# Patient Record
Sex: MALE | Race: WHITE
[De-identification: names, ages, dates, MRNs, and addresses within clinical notes are randomized per-mention and may not be internally consistent; named-entity substitution may affect disease eponyms.]

---

## 2018-07-27 ENCOUNTER — HOSPITAL ENCOUNTER (OUTPATIENT)
Dept: HOSPITAL 62 - RAD | Age: 23
End: 2018-07-27
Attending: STUDENT IN AN ORGANIZED HEALTH CARE EDUCATION/TRAINING PROGRAM
Payer: OTHER GOVERNMENT
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
## 2018-07-27 DIAGNOSIS — S43.401A: Primary | ICD-10-CM
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
## 2018-07-27 NOTE — RADIOLOGY REPORT (SQ)
EXAM DESCRIPTION:  MRI RT UPPER JOINT WITHOUT



COMPLETED DATE/TIME:  7/27/2018 12:53 pm



REASON FOR STUDY:  LABRAL TEAR



COMPARISON:  None.



TECHNIQUE:  Right shoulder images acquired and stored on PACS. Multiplanar imaging to include fat sen
sitive sequences such as T1, water sensitive sequences such as FST2/STIR, cartilage sensitive sequenc
es such as FSPD/gradient-echo sequences.



LIMITATIONS:  Patient refused arthrogram.



FINDINGS:  BONE MARROW AND CORTEX: No worrisome bone lesions or marrow replacement. No occult fractur
es.

JOINT OR BURSAL EFFUSION: No significant joint or bursal fluid.  No suggestion of loose bodies.

GLENO-HUMERAL ARTICULATION: Normal articulation. No subluxation. No cystic change. No osteophytes or 
cartilage loss.

ACROMION AND AC JOINT: Type 1 acromion.  Lateral offset distal acromion.

ROTATOR CUFF AND INTERVAL: No significant tear or signal alteration.  No cuff muscle atrophy.

No rotator interval tear. No rotator interval thickening to suggest adhesive capsulitis.

 LABRUM  AND BICEPS LABRAL COMPLEX: Intact. No labral tear. Intra-articular long-head biceps tendon n
ormal.  Distal biceps in normal location in bicipital groove.

REMAINDER OF LABRUM AND IGHL : No gross tear or paralabral cyst formation.  Labral evaluation is less
 than optimal without joint distention. No thickening of IGHL to suggest adhesive capsulitis.

PERIARTICULAR AND ADJACENT SOFT TISSUES: No masses or abnormal nodes.

OTHER: No other significant finding.



IMPRESSION:  NORMAL MRI OF THE SHOULDER.



TECHNICAL DOCUMENTATION:  JOB ID:  [id number]

 2011 [employer]- All Rights Reserved



Reading location - IP/workstation name: [email or web address] [id number]